# Patient Record
Sex: MALE | Race: WHITE | NOT HISPANIC OR LATINO | URBAN - METROPOLITAN AREA
[De-identification: names, ages, dates, MRNs, and addresses within clinical notes are randomized per-mention and may not be internally consistent; named-entity substitution may affect disease eponyms.]

---

## 2023-05-23 ENCOUNTER — OFFICE VISIT (OUTPATIENT)
Dept: PRIMARY CARE CLINIC | Facility: CLINIC | Age: 63
End: 2023-05-23
Payer: OTHER GOVERNMENT

## 2023-05-23 ENCOUNTER — PATIENT MESSAGE (OUTPATIENT)
Dept: PRIMARY CARE CLINIC | Facility: CLINIC | Age: 63
End: 2023-05-23

## 2023-05-23 VITALS
DIASTOLIC BLOOD PRESSURE: 76 MMHG | SYSTOLIC BLOOD PRESSURE: 129 MMHG | WEIGHT: 222 LBS | OXYGEN SATURATION: 98 % | BODY MASS INDEX: 31.78 KG/M2 | HEART RATE: 62 BPM | HEIGHT: 70 IN

## 2023-05-23 DIAGNOSIS — E83.39 HYPOPHOSPHATASIA: ICD-10-CM

## 2023-05-23 DIAGNOSIS — G47.33 OBSTRUCTIVE SLEEP APNEA SYNDROME: Primary | ICD-10-CM

## 2023-05-23 DIAGNOSIS — Z98.890 H/O BASAL CELL CARCINOMA EXCISION: ICD-10-CM

## 2023-05-23 DIAGNOSIS — R21 SKIN RASH: ICD-10-CM

## 2023-05-23 DIAGNOSIS — E66.9 OBESITY (BMI 30.0-34.9): ICD-10-CM

## 2023-05-23 DIAGNOSIS — Z12.11 SCREENING FOR COLON CANCER: ICD-10-CM

## 2023-05-23 DIAGNOSIS — M24.541 CONTRACTURE OF JOINT OF RIGHT HAND: ICD-10-CM

## 2023-05-23 DIAGNOSIS — Z85.828 H/O BASAL CELL CARCINOMA EXCISION: ICD-10-CM

## 2023-05-23 DIAGNOSIS — Z12.5 SCREENING FOR PROSTATE CANCER: ICD-10-CM

## 2023-05-23 PROCEDURE — 99204 OFFICE O/P NEW MOD 45 MIN: CPT | Mod: S$GLB,,, | Performed by: INTERNAL MEDICINE

## 2023-05-23 PROCEDURE — 99204 PR OFFICE/OUTPT VISIT, NEW, LEVL IV, 45-59 MIN: ICD-10-PCS | Mod: S$GLB,,, | Performed by: INTERNAL MEDICINE

## 2023-05-23 RX ORDER — MUPIROCIN 20 MG/G
OINTMENT TOPICAL 3 TIMES DAILY
Qty: 30 G | Refills: 0 | Status: SHIPPED | OUTPATIENT
Start: 2023-05-23 | End: 2023-07-07

## 2023-05-23 NOTE — PROGRESS NOTES
Subjective:      Patient ID: Triston León is a 62 y.o. male.    Chief Complaint: Establish Care    HPI    Past Medical History:   Diagnosis Date    Lumbar herniated disc     L5    Skin cancer     every 2-3 yrs         Past Surgical History:   Procedure Laterality Date    ADENOIDECTOMY      DENTAL SURGERY      SKIN CANCER EXCISION          Family History   Problem Relation Age of Onset    No Known Problems Mother     Heart disease Father     Diabetes Maternal Grandmother     Diabetes Maternal Grandfather     Heart disease Paternal Grandfather          Social History     Socioeconomic History    Marital status:    Tobacco Use    Smoking status: Never     Passive exposure: Never    Smokeless tobacco: Never   Substance and Sexual Activity    Alcohol use: Yes     Comment: 3 times a week with 2 beers or 2 glasses of wine on each day.    Drug use: Never     Patient here to establish care    He used to be an army  and suffered a herniated disc and has broken both wrists when he was 7 and then 8 these were both due to trauma/falls    He has a contracted left 5th digit and states he has a family history of hypophosphatasia    H/o Mohs surgery for basal cancer under the left eye, he has a h/o basal cell cancers and will also need another skin check     Patient also states his former wife told him he gasps for air at night and would like a sleep study               Review of Systems   Constitutional:  Negative for chills and fever.   HENT:  Negative for hearing loss.    Eyes:  Negative for blurred vision.   Respiratory:  Negative for cough, shortness of breath and wheezing.    Cardiovascular:  Negative for chest pain, palpitations and leg swelling.   Gastrointestinal:  Negative for abdominal pain, blood in stool, constipation, diarrhea, melena, nausea and vomiting.   Genitourinary:  Negative for dysuria, frequency and urgency.   Musculoskeletal:  Negative for falls.   Skin:  Positive for rash.        Behind  "left ear lobe   Neurological:  Negative for dizziness and headaches.   Endo/Heme/Allergies:  Does not bruise/bleed easily.   Psychiatric/Behavioral:  Negative for depression. The patient is not nervous/anxious.    Objective:     Physical Exam  Vitals reviewed.   Constitutional:       Appearance: Normal appearance.   HENT:      Head: Normocephalic.      Mouth/Throat:      Mouth: Mucous membranes are moist.      Pharynx: Oropharynx is clear.   Eyes:      Extraocular Movements: Extraocular movements intact.      Conjunctiva/sclera: Conjunctivae normal.      Pupils: Pupils are equal, round, and reactive to light.   Cardiovascular:      Rate and Rhythm: Normal rate and regular rhythm.   Pulmonary:      Effort: Pulmonary effort is normal.      Breath sounds: Normal breath sounds.   Abdominal:      General: Bowel sounds are normal.   Musculoskeletal:         General: Deformity present.      Right lower leg: No edema.      Left lower leg: No edema.      Comments: Right 5th digit   Skin:     General: Skin is warm.      Capillary Refill: Capillary refill takes less than 2 seconds.   Neurological:      General: No focal deficit present.      Mental Status: He is alert.   Psychiatric:         Mood and Affect: Mood normal.   /76 (BP Location: Left arm, Patient Position: Sitting, BP Method: Medium (Automatic))   Pulse 62   Ht 5' 10" (1.778 m)   Wt 100.7 kg (222 lb)   SpO2 98%   BMI 31.85 kg/m²     Assessment:       ICD-10-CM ICD-9-CM   1. Obstructive sleep apnea syndrome  G47.33 327.23   2. Contracture of joint of right hand  M24.541 718.44   3. 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 Screening for prostate cancer  Z12.5 V76.44   5. Screening for colon cancer  Z12.11 V76.51   6. H/O basal cell carcinoma excision  Z98.890 V10.83    Z85.828    7. Skin rash  R21 782.1   8. Obesity (BMI 30.0-34.9)  E66.9 278.00       Plan:          1. Obstructive sleep apnea syndrome  -     Ambulatory referral/consult to Pulmonology; Future; " Expected date: 05/30/2023    2. Contracture of joint of right hand  -     Ambulatory referral/consult to Orthopedics; Future; Expected date: 05/30/2023    3. Hypophosphatasia  -     CBC Auto Differential; Future; Expected date: 05/23/2023  -     Comprehensive Metabolic Panel; Future; Expected date: 05/23/2023  -     Lipid Panel; Future; Expected date: 05/23/2023  -     PTH, intact; Future; Expected date: 05/23/2023    4. Screening for prostate cancer  -     PSA, Screening; Future; Expected date: 05/23/2023    5. Screening for colon cancer  -     Ambulatory referral/consult to Gastroenterology; Future; Expected date: 05/30/2023    6. H/O basal cell carcinoma excision  -     Ambulatory referral/consult to Dermatology; Future; Expected date: 05/30/2023    7. Skin rash  -     mupirocin (BACTROBAN) 2 % ointment; Apply topically 3 (three) times daily.  Dispense: 30 g; Refill: 0    8. Obesity (BMI 30.0-34.9)         Counseled on diet and exercise

## 2023-05-24 LAB
ABS NRBC COUNT: 0 THOU/UL (ref 0–0.01)
ABSOLUTE BASOPHIL: 0.1 10*3/UL (ref 0–0.3)
ABSOLUTE EOSINOPHIL: 0.3 10*3/UL (ref 0–0.6)
ABSOLUTE IMMATURE GRAN: 0.05 THOU/UL (ref 0–0.03)
ABSOLUTE LYMPHOCYTE: 1.3 10*3/UL (ref 1.2–4)
ABSOLUTE MONOCYTE: 0.6 10*3/UL (ref 0.1–0.8)
ALBUMIN SERPL BCP-MCNC: 3.4 G/DL (ref 3.4–5)
ALP SERPL-CCNC: 76 U/L (ref 45–117)
ALT SERPL W P-5'-P-CCNC: 68 U/L (ref 16–61)
ANION GAP SERPL CALC-SCNC: 8 MMOL/L (ref 3–11)
AST SERPL-CCNC: 26 U/L (ref 15–37)
BASOPHILS NFR BLD: 0.9 % (ref 0–3)
BILIRUB SERPL-MCNC: 0.4 MG/DL (ref 0.2–1)
BUN SERPL-MCNC: 8 MG/DL (ref 7–18)
BUN/CREAT SERPL: 6.83 RATIO
CALCIUM SERPL-MCNC: 8.6 MG/DL (ref 8.5–10.1)
CHLORIDE SERPL-SCNC: 110 MMOL/L (ref 98–107)
CHOLEST SERPL-MSCNC: 107 MG/DL
CO2 SERPL-SCNC: 25 MMOL/L (ref 21–32)
CREAT SERPL-MCNC: 1.17 MG/DL (ref 0.7–1.3)
EOSINOPHIL NFR BLD: 3.8 % (ref 0–6)
ERYTHROCYTE [DISTWIDTH] IN BLOOD BY AUTOMATED COUNT: 13.2 % (ref 0–15.5)
GFR ESTIMATION: > 60
GLUCOSE SERPL-MCNC: 105 MG/DL (ref 74–106)
HCT VFR BLD AUTO: 43.6 % (ref 42–52)
HDLC SERPL-MCNC: 58 MG/DL
HGB BLD-MCNC: 14.7 G/DL (ref 14–18)
IMMATURE GRANULOCYTES: 0.8 % (ref 0–0.43)
LDLC SERPL CALC-MCNC: 44.8 MG/DL
LYMPHOCYTES NFR BLD: 19.1 % (ref 20–45)
MCH RBC QN AUTO: 32 PG (ref 27–32)
MCHC RBC AUTO-ENTMCNC: 33.7 % (ref 32–36)
MCV RBC AUTO: 94.8 FL (ref 80–97)
MONOCYTES NFR BLD: 9 % (ref 2–10)
NEUTROPHILS # BLD AUTO: 4.4 10*3/UL (ref 1.4–7)
NEUTROPHILS NFR BLD: 66.4 % (ref 50–80)
NUCLEATED RED BLOOD CELLS: 0 % (ref 0–0.2)
PLATELETS: 232 10*3/UL (ref 130–400)
PMV BLD AUTO: 10.9 FL (ref 9.2–12.2)
POTASSIUM SERPL-SCNC: 4.1 MMOL/L (ref 3.5–5.1)
PROT SERPL-MCNC: 6.4 G/DL (ref 6.4–8.2)
PSA: 1.95 NG/ML (ref 0.1–4)
PTH, INTACT: 75.3 PG/ML (ref 18.5–88)
RBC # BLD AUTO: 4.6 10*6/UL (ref 4.7–6.1)
SODIUM BLD-SCNC: 143 MMOL/L (ref 131–143)
TRIGL SERPL-MCNC: 21 MG/DL (ref 0–149)
VLDL CHOLESTEROL: 4 MG/DL
WBC # BLD: 6.6 10*3/UL (ref 4.5–10)

## 2023-06-16 ENCOUNTER — PATIENT MESSAGE (OUTPATIENT)
Dept: DERMATOLOGY | Facility: CLINIC | Age: 63
End: 2023-06-16
Payer: OTHER GOVERNMENT

## 2023-06-16 ENCOUNTER — TELEPHONE (OUTPATIENT)
Dept: PRIMARY CARE CLINIC | Facility: CLINIC | Age: 63
End: 2023-06-16
Payer: OTHER GOVERNMENT

## 2023-06-16 NOTE — TELEPHONE ENCOUNTER
----- Message from Brenna Alonso sent at 6/16/2023 12:48 PM CDT -----  Contact: Noemy/[facility]  Noemy called in to say that the refferal is received and the patient is schedule for 6/27/23@1:00 she stated no call back is needed.    Thanks  Td

## 2023-07-07 ENCOUNTER — OFFICE VISIT (OUTPATIENT)
Dept: GASTROENTEROLOGY | Facility: CLINIC | Age: 63
End: 2023-07-07
Payer: OTHER GOVERNMENT

## 2023-07-07 VITALS
SYSTOLIC BLOOD PRESSURE: 130 MMHG | HEART RATE: 67 BPM | WEIGHT: 219 LBS | HEIGHT: 70 IN | OXYGEN SATURATION: 98 % | BODY MASS INDEX: 31.35 KG/M2 | DIASTOLIC BLOOD PRESSURE: 89 MMHG

## 2023-07-07 DIAGNOSIS — Z12.11 SCREENING FOR COLON CANCER: ICD-10-CM

## 2023-07-07 DIAGNOSIS — K21.9 GASTROESOPHAGEAL REFLUX DISEASE, UNSPECIFIED WHETHER ESOPHAGITIS PRESENT: Primary | ICD-10-CM

## 2023-07-07 PROCEDURE — 99203 PR OFFICE/OUTPT VISIT, NEW, LEVL III, 30-44 MIN: ICD-10-PCS | Mod: S$GLB,,,

## 2023-07-07 PROCEDURE — 99203 OFFICE O/P NEW LOW 30 MIN: CPT | Mod: S$GLB,,,

## 2023-07-07 RX ORDER — SOD SULF/POT CHLORIDE/MAG SULF 1.479 G
12 TABLET ORAL DAILY
Qty: 24 TABLET | Refills: 0 | Status: SHIPPED | OUTPATIENT
Start: 2023-07-07

## 2023-07-07 NOTE — PROGRESS NOTES
Clinic Note    Reason for visit:  The primary encounter diagnosis was Gastroesophageal reflux disease, unspecified whether esophagitis present. A diagnosis of Screening for colon cancer was also pertinent to this visit.    PCP: Erin Hannah   [address] [facility] [address] / [place] [address]    HPI:  This is a 62 y.o. male who is here to establish care. Patient is overdue for first screening colonoscopy. No FH of colon cancer. He denies abdominal pain, diarrhea, constipation, or blood in stool. He has had some reflux more recently due to diet change. He moved here recently from [place] and was eating fresh vegetables and healthier food there.       Review of Systems   Constitutional:  Negative for diaphoresis, fatigue, fever and unexpected weight change.   HENT:  Negative for hearing loss, mouth sores, postnasal drip, sore throat and trouble swallowing.    Eyes:  Negative for pain, discharge and eye dryness.   Respiratory:  Positive for apnea. Negative for cough, choking, chest tightness, shortness of breath and wheezing.    Cardiovascular:  Negative for chest pain, palpitations and leg swelling.   Gastrointestinal:  Negative for abdominal distention, abdominal pain, anal bleeding, blood in stool, change in bowel habit, constipation, diarrhea, nausea, rectal pain, vomiting, reflux, fecal incontinence and change in bowel habit.   Genitourinary:  Negative for bladder incontinence, dysuria and hematuria.   Musculoskeletal:  Positive for joint swelling. Negative for arthralgias and back pain.   Integumentary:  Negative for color change and rash.   Allergic/Immunologic: Negative for environmental allergies and food allergies.   Neurological:  Positive for headaches. Negative for seizures.   Hematological:  Negative for adenopathy. Does not bruise/bleed easily.      Past Medical History:   Diagnosis Date    BMI 31.0-31.9,adult     Dupuytren's contracture     R hand, pinky finger    History of hepatitis A 1981    Lumbar  "herniated disc     L5    Skin cancer     every 2-3 yrs     Past Surgical History:   Procedure Laterality Date    ADENOIDECTOMY      DENTAL SURGERY      SKIN CANCER EXCISION      WRIST SURGERY      x 2     Family History   Problem Relation Age of Onset    No Known Problems Mother     Heart disease Father     Diabetes Maternal Grandmother     Diabetes Maternal Grandfather     Heart disease Paternal Grandfather      Social History     Tobacco Use    Smoking status: Never     Passive exposure: Never    Smokeless tobacco: Never   Substance Use Topics    Alcohol use: Yes     Comment: 3 times a week with 2 beers or 2 glasses of wine on each day.    Drug use: Never     Review of patient's allergies indicates:   Allergen Reactions    Marijuana/cannabinoid Nausea And Vomiting      Medication List with Changes/Refills   New Medications    SOD SULF-POT CHLORIDE-MAG SULF (SUTAB) 1.479-0.188- 0.225 GRAM TABLET    Take 12 tablets by mouth once daily. Take according to package instructions with indicated amount of water. No breakfast day before test. May substitute with Suprep, Clenpiq, Plenvu, Moviprep or GoLytely based on Rx plan and patient preference.   Discontinued Medications    MUPIROCIN (BACTROBAN) 2 % OINTMENT    Apply topically 3 (three) times daily.         Vital Signs:  /89   Pulse 67   Ht 5' 10" (1.778 m)   Wt 99.3 kg (219 lb)   SpO2 98%   BMI 31.42 kg/m²        Physical Exam  Constitutional:       General: He is not in acute distress.     Appearance: Normal appearance. He is well-developed. He is not ill-appearing or toxic-appearing.   HENT:      Head: Normocephalic and atraumatic.      Nose: Nose normal.      Mouth/Throat:      Mouth: Mucous membranes are moist.      Pharynx: Oropharynx is clear. No oropharyngeal exudate or posterior oropharyngeal erythema.   Eyes:      General: Lids are normal. No scleral icterus.        Right eye: No discharge.         Left eye: No discharge.      Conjunctiva/sclera: " Conjunctivae normal.   Cardiovascular:      Rate and Rhythm: Normal rate and regular rhythm.      Pulses:           Radial pulses are 2+ on the right side and 2+ on the left side.   Pulmonary:      Effort: Pulmonary effort is normal. No respiratory distress.      Breath sounds: No stridor. No wheezing.   Abdominal:      General: Bowel sounds are normal. There is no distension.      Palpations: Abdomen is soft. There is no fluid wave, hepatomegaly, splenomegaly or mass.      Tenderness: There is no abdominal tenderness. There is no guarding or rebound.   Musculoskeletal:      Cervical back: Full passive range of motion without pain.   Lymphadenopathy:      Cervical: No cervical adenopathy.   Skin:     General: Skin is warm and dry.      Capillary Refill: Capillary refill takes less than 2 seconds.      Coloration: Skin is not cyanotic, jaundiced or pale.   Neurological:      General: No focal deficit present.      Mental Status: He is alert and oriented to person, place, and time.   Psychiatric:         Mood and Affect: Mood normal.         Behavior: Behavior is cooperative.          All of the data above and below has been reviewed by myself and any further interpretations will be reflected in the assessment and plan.   The data includes review of external notes, and independent interpretation of lab results, procedures, x-rays, and imaging reports.      Assessment:  Gastroesophageal reflux disease, unspecified whether esophagitis present    Screening for colon cancer  -     Ambulatory referral/consult to Gastroenterology  -     sod sulf-pot chloride-mag sulf (SUTAB) 1.479-0.188- 0.225 gram tablet; Take 12 tablets by mouth once daily. Take according to package instructions with indicated amount of water. No breakfast day before test. May substitute with Suprep, Clenpiq, Plenvu, Moviprep or GoLytely based on Rx plan and patient preference.  Dispense: 24 tablet; Refill: 0  -     Ambulatory Referral to External  Surgery    GERD: takes Gas-X as needed.      Recommendations:  Schedule colonoscopy with Dr. Adams at [facility] with sutab.     Risks, benefits, and alternatives of medical management, any associated procedures, and/or treatment discussed with the patient. Patient given opportunity to ask questions and voices understanding. Patient has elected to proceed with the recommended care modalities as discussed.    Follow up if symptoms worsen or fail to improve.    Order summary:  Orders Placed This Encounter   Procedures    Ambulatory Referral to External Surgery        Instructed patient to notify my office if they have not been contacted within two weeks after any procedures, submitting any samples (biopsies, blood, stool, urine, etc.) or after any imaging (X-ray, CT, MRI, etc.).      Olga Perez NP    This document may have been created using a voice recognition transcribing system. Incorrect words or phrases may have been missed during proofreading. Please interpret accordingly or contact me for clarification.

## 2023-07-07 NOTE — PATIENT INSTRUCTIONS
Schedule colonoscopy with Dr. Adams.    Please notify my office if you have not been contacted within two weeks after any procedures, submitting any samples (biopsies, blood, stool, urine, etc.) or after any imaging (X-ray, CT, MRI, etc.).

## 2023-08-17 ENCOUNTER — TELEPHONE (OUTPATIENT)
Dept: GASTROENTEROLOGY | Facility: CLINIC | Age: 63
End: 2023-08-17
Payer: OTHER GOVERNMENT

## 2023-08-21 ENCOUNTER — TELEPHONE (OUTPATIENT)
Dept: GASTROENTEROLOGY | Facility: CLINIC | Age: 63
End: 2023-08-21
Payer: OTHER GOVERNMENT

## 2023-08-21 DIAGNOSIS — Z12.11 SCREENING FOR COLON CANCER: Primary | ICD-10-CM

## 2023-08-21 NOTE — TELEPHONE ENCOUNTER
RETURNED CALLERS CALL AND GOT THE MEDICATION SENT AGAIN TO [facility] AND INFORMED PT

## 2023-08-21 NOTE — PROGRESS NOTES
"[facility] [facility] [facility]  [address] [address]  Phone: [phone]  Fax: [phone]    History & Physical         Provider: Dr. Gunnar Adams    Patient Name: Triston Silva                                                                       (age):1960  62 y.o.           Gender: male   Phone: [phone]     Referring Physician: Erin Hannah     Vital Signs:   Height - 5' 10"  Weight - 219 lbs  BMI -  31.4    Plan: Colonoscopy    Encounter Diagnosis   Name Primary?    Screening for colon cancer Yes           History:      Past Medical History:   Diagnosis Date    BMI 31.0-31.9,adult     Dupuytren's contracture     R hand, pinky finger    History of hepatitis A     Lumbar herniated disc     L5    Skin cancer     every 2-3 yrs      Past Surgical History:   Procedure Laterality Date    ADENOIDECTOMY      DENTAL SURGERY      SKIN CANCER EXCISION      WRIST SURGERY      x 2      Medication List with Changes/Refills   Current Medications    SOD SULF-POT CHLORIDE-MAG SULF (SUTAB) 1.479-0.188- 0.225 GRAM TABLET    Take 12 tablets by mouth once daily. Take according to package instructions with indicated amount of water. No breakfast day before test. May substitute with Suprep, Clenpiq, Plenvu, Moviprep or GoLytely based on Rx plan and patient preference.      Review of patient's allergies indicates:   Allergen Reactions    Marijuana/cannabinoid Nausea And Vomiting      Family History   Problem Relation Age of Onset    No Known Problems Mother     Heart disease Father     Diabetes Maternal Grandmother     Diabetes Maternal Grandfather     Heart disease Paternal Grandfather       Social History     Tobacco Use    Smoking status: Never     Passive exposure: Never    Smokeless tobacco: Never   Substance Use Topics    Alcohol use: Yes     Comment: 3 times a week with 2 beers or 2 glasses of wine on each day.    Drug use: Never        Physical " Examination:     General Appearance:___________________________  HEENT: _____________________________________  Abdomen:____________________________________  Heart:________________________________________  Lungs:_______________________________________  Extremities:___________________________________  Skin:_________________________________________  Endocrine:____________________________________  Genitourinary:_________________________________  Neurological:__________________________________      Patient has been evaluated immediately prior to sedation and is medically cleared for endoscopy with IVCS as an ASA class: ______      Physician Signature: _________________________       Date: ________  Time: ________

## 2023-08-21 NOTE — TELEPHONE ENCOUNTER
----- Message from Rosy Casey sent at 8/21/2023  4:38 PM CDT -----  Patient is calling in regards to pharmacy [facility] have not receive pre for colonoscopy procedure..please call patient back at [phone] or [phone]                  Thanks  candelario

## 2023-08-25 ENCOUNTER — PATIENT MESSAGE (OUTPATIENT)
Dept: PRIMARY CARE CLINIC | Facility: CLINIC | Age: 63
End: 2023-08-25
Payer: OTHER GOVERNMENT

## 2023-08-25 ENCOUNTER — OUTSIDE PLACE OF SERVICE (OUTPATIENT)
Dept: GASTROENTEROLOGY | Facility: CLINIC | Age: 63
End: 2023-08-25

## 2023-08-25 LAB — CRC RECOMMENDATION EXT: NORMAL

## 2023-08-25 PROCEDURE — 45385 PR COLONOSCOPY,REMV LESN,SNARE: ICD-10-PCS | Mod: PT,,, | Performed by: INTERNAL MEDICINE

## 2023-08-25 PROCEDURE — 45385 COLONOSCOPY W/LESION REMOVAL: CPT | Mod: PT,,, | Performed by: INTERNAL MEDICINE

## 2023-09-20 ENCOUNTER — DOCUMENTATION ONLY (OUTPATIENT)
Dept: GASTROENTEROLOGY | Facility: CLINIC | Age: 63
End: 2023-09-20
Payer: OTHER GOVERNMENT

## 2023-09-28 ENCOUNTER — OFFICE VISIT (OUTPATIENT)
Dept: DERMATOLOGY | Facility: CLINIC | Age: 63
End: 2023-09-28
Payer: OTHER GOVERNMENT

## 2023-09-28 VITALS — BODY MASS INDEX: 31.34 KG/M2 | HEIGHT: 70 IN | WEIGHT: 218.94 LBS

## 2023-09-28 DIAGNOSIS — D48.5 NEOPLASM OF UNCERTAIN BEHAVIOR OF SKIN: Primary | ICD-10-CM

## 2023-09-28 DIAGNOSIS — L57.0 ACTINIC KERATOSES: ICD-10-CM

## 2023-09-28 DIAGNOSIS — L82.1 SEBORRHEIC KERATOSES: ICD-10-CM

## 2023-09-28 PROCEDURE — 17003 DESTRUCTION, PREMALIGNANT LESIONS; SECOND THROUGH 14 LESIONS: ICD-10-PCS | Mod: S$PBB,,, | Performed by: STUDENT IN AN ORGANIZED HEALTH CARE EDUCATION/TRAINING PROGRAM

## 2023-09-28 PROCEDURE — 88305 TISSUE EXAM BY PATHOLOGIST: ICD-10-PCS | Mod: 26,,, | Performed by: PATHOLOGY

## 2023-09-28 PROCEDURE — 11102 TANGNTL BX SKIN SINGLE LES: CPT | Mod: PBBFAC | Performed by: STUDENT IN AN ORGANIZED HEALTH CARE EDUCATION/TRAINING PROGRAM

## 2023-09-28 PROCEDURE — 99203 OFFICE O/P NEW LOW 30 MIN: CPT | Mod: 25,S$PBB,, | Performed by: STUDENT IN AN ORGANIZED HEALTH CARE EDUCATION/TRAINING PROGRAM

## 2023-09-28 PROCEDURE — 99999 PR PBB SHADOW E&M-EST. PATIENT-LVL III: ICD-10-PCS | Mod: PBBFAC,,, | Performed by: STUDENT IN AN ORGANIZED HEALTH CARE EDUCATION/TRAINING PROGRAM

## 2023-09-28 PROCEDURE — 11103 TANGNTL BX SKIN EA SEP/ADDL: CPT | Mod: S$PBB,,, | Performed by: STUDENT IN AN ORGANIZED HEALTH CARE EDUCATION/TRAINING PROGRAM

## 2023-09-28 PROCEDURE — 17000 PR DESTRUCTION(LASER SURGERY,CRYOSURGERY,CHEMOSURGERY),PREMALIGNANT LESIONS,FIRST LESION: ICD-10-PCS | Mod: S$PBB,XS,, | Performed by: STUDENT IN AN ORGANIZED HEALTH CARE EDUCATION/TRAINING PROGRAM

## 2023-09-28 PROCEDURE — 17003 DESTRUCT PREMALG LES 2-14: CPT | Mod: PBBFAC | Performed by: STUDENT IN AN ORGANIZED HEALTH CARE EDUCATION/TRAINING PROGRAM

## 2023-09-28 PROCEDURE — 99999 PR PBB SHADOW E&M-EST. PATIENT-LVL III: CPT | Mod: PBBFAC,,, | Performed by: STUDENT IN AN ORGANIZED HEALTH CARE EDUCATION/TRAINING PROGRAM

## 2023-09-28 PROCEDURE — 99213 OFFICE O/P EST LOW 20 MIN: CPT | Mod: PBBFAC,25 | Performed by: STUDENT IN AN ORGANIZED HEALTH CARE EDUCATION/TRAINING PROGRAM

## 2023-09-28 PROCEDURE — 11102 TANGNTL BX SKIN SINGLE LES: CPT | Mod: S$PBB,,, | Performed by: STUDENT IN AN ORGANIZED HEALTH CARE EDUCATION/TRAINING PROGRAM

## 2023-09-28 PROCEDURE — 17003 DESTRUCT PREMALG LES 2-14: CPT | Mod: S$PBB,,, | Performed by: STUDENT IN AN ORGANIZED HEALTH CARE EDUCATION/TRAINING PROGRAM

## 2023-09-28 PROCEDURE — 11102 PR TANGENTIAL BIOPSY, SKIN, SINGLE LESION: ICD-10-PCS | Mod: S$PBB,,, | Performed by: STUDENT IN AN ORGANIZED HEALTH CARE EDUCATION/TRAINING PROGRAM

## 2023-09-28 PROCEDURE — 17000 DESTRUCT PREMALG LESION: CPT | Mod: S$PBB,XS,, | Performed by: STUDENT IN AN ORGANIZED HEALTH CARE EDUCATION/TRAINING PROGRAM

## 2023-09-28 PROCEDURE — 17000 DESTRUCT PREMALG LESION: CPT | Mod: 59,PBBFAC | Performed by: STUDENT IN AN ORGANIZED HEALTH CARE EDUCATION/TRAINING PROGRAM

## 2023-09-28 PROCEDURE — 88305 TISSUE EXAM BY PATHOLOGIST: CPT | Mod: 26,,, | Performed by: PATHOLOGY

## 2023-09-28 PROCEDURE — 99203 PR OFFICE/OUTPT VISIT, NEW, LEVL III, 30-44 MIN: ICD-10-PCS | Mod: 25,S$PBB,, | Performed by: STUDENT IN AN ORGANIZED HEALTH CARE EDUCATION/TRAINING PROGRAM

## 2023-09-28 PROCEDURE — 88305 TISSUE EXAM BY PATHOLOGIST: CPT | Performed by: PATHOLOGY

## 2023-09-28 PROCEDURE — 11103 PR TANGENTIAL BIOPSY, SKIN, EA ADDTL LESION: ICD-10-PCS | Mod: S$PBB,,, | Performed by: STUDENT IN AN ORGANIZED HEALTH CARE EDUCATION/TRAINING PROGRAM

## 2023-09-28 PROCEDURE — 11103 TANGNTL BX SKIN EA SEP/ADDL: CPT | Mod: PBBFAC | Performed by: STUDENT IN AN ORGANIZED HEALTH CARE EDUCATION/TRAINING PROGRAM

## 2023-09-28 NOTE — PROGRESS NOTES
Subjective:       Patient ID:  Triston Silva is a 63 y.o. male who presents for   Chief Complaint   Patient presents with    Spot     Pt has come in for skin check      History of Present Illness: The patient presents with chief complaint of multiple skin lesions and non healing skin lesions.   Location: nose, behind the ear and the left neck. Also several red scaly areas on the arms and the face  Duration: months  Signs/Symptoms: several non healing skin lesions  Prior treatments: none  Patient has a history of multiple skin cancers.       Spot        Review of Systems   Constitutional:  Negative for fever and chills.   Skin:  Negative for itching, rash and dry skin.        Objective:    Physical Exam   Constitutional: He appears well-developed and well-nourished. No distress.   Neurological: He is alert and oriented to person, place, and time. He is not disoriented.   Psychiatric: He has a normal mood and affect.   Skin:   Areas Examined (abnormalities noted in diagram):   Head / Face Inspection Performed  Neck Inspection Performed  Chest / Axilla Inspection Performed  Abdomen Inspection Performed  Back Inspection Performed  RUE Inspected  LUE Inspection Performed  RLE Inspected  LLE Inspection Performed                   Diagram Legend     Erythematous scaling macule/papule c/w actinic keratosis       Vascular papule c/w angioma      Pigmented verrucoid papule/plaque c/w seborrheic keratosis      Yellow umbilicated papule c/w sebaceous hyperplasia      Irregularly shaped tan macule c/w lentigo     1-2 mm smooth white papules consistent with Milia      Movable subcutaneous cyst with punctum c/w epidermal inclusion cyst      Subcutaneous movable cyst c/w pilar cyst      Firm pink to brown papule c/w dermatofibroma      Pedunculated fleshy papule(s) c/w skin tag(s)      Evenly pigmented macule c/w junctional nevus     Mildly variegated pigmented, slightly irregular-bordered macule c/w mildly atypical  nevus      Flesh colored to evenly pigmented papule c/w intradermal nevus       Pink pearly papule/plaque c/w basal cell carcinoma      Erythematous hyperkeratotic cursted plaque c/w SCC      Surgical scar with no sign of skin cancer recurrence      Open and closed comedones      Inflammatory papules and pustules      Verrucoid papule consistent consistent with wart     Erythematous eczematous patches and plaques     Dystrophic onycholytic nail with subungual debris c/w onychomycosis     Umbilicated papule    Erythematous-base heme-crusted tan verrucoid plaque consistent with inflamed seborrheic keratosis     Erythematous Silvery Scaling Plaque c/w Psoriasis     See annotation                Assessment / Plan:      Pathology Orders:       Normal Orders This Visit    Specimen to Pathology, Dermatology     Questions:    Procedure Type: Dermatology and skin neoplasms    Number of Specimens: 3    ------------------------: -------------------------    Spec 1 Procedure: Biopsy    Spec 1 Clinical Impression: r/o NMSC    Spec 1 Source: right nasal ala    ------------------------: -------------------------    Spec 2 Procedure: Biopsy    Spec 2 Clinical Impression: r/o NMSC    Spec 2 Source: left neck    ------------------------: -------------------------    Spec 3 Procedure: Biopsy    Spec 3 Clinical Impression: r/o NMSC    Spec 3 Source: left posterior ear    Release to patient:           Neoplasm of uncertain behavior of skin  -     Specimen to Pathology, Dermatology  Shave biopsy procedure note:    Shave biopsy performed after verbal consent including risk of infection, scar, recurrence, need for additional treatment of site. Area prepped with alcohol, anesthetized with approximately 1.0cc of 1% lidocaine with epinephrine. Lesional tissue shaved with razor blade. Hemostasis achieved with application of aluminum chloride followed by hyfrecation. No complications. Dressing applied. Wound care explained.    If biopsy  positive for malignancy, refer for Mohs surgery consultation.    Actinic keratoses  Cryosurgery Procedure Note    Verbal consent from the patient is obtained including, but not limited to, risk of hypopigmentation/hyperpigmentation, scar, recurrence of lesion. The patient is aware of the precancerous quality and need for treatment of these lesions. Liquid nitrogen cryosurgery is applied to the 8 actinic keratoses, as detailed in the physical exam, to produce a freeze injury. The patient is aware that blisters may form and is instructed on wound care with gentle cleansing and use of vaseline ointment to keep moist until healed. The patient is supplied a handout on cryosurgery and is instructed to call if lesions do not completely resolve.    Seborrheic keratoses  These are benign inherited growths without a malignant potential. Reassurance given to patient. No treatment is necessary.              Follow up in about 6 months (around 3/28/2024).

## 2023-10-03 LAB
FINAL PATHOLOGIC DIAGNOSIS: NORMAL
GROSS: NORMAL
Lab: NORMAL
MICROSCOPIC EXAM: NORMAL

## 2023-10-04 ENCOUNTER — TELEPHONE (OUTPATIENT)
Dept: DERMATOLOGY | Facility: CLINIC | Age: 63
End: 2023-10-04
Payer: OTHER GOVERNMENT

## 2023-10-04 NOTE — TELEPHONE ENCOUNTER
----- Message from Will Jordan MD sent at 10/4/2023  7:04 AM CDT -----  Hey! Please call and inform the patient that the biopsy results came back, and all 3 lesions are called basal cell carcinoma and given the location of the one on the nose and behind the ear, we will refer him to Mohs. Please refer to Dr. Timbo Kincaid.     The lesion on the neck, we can excise in clinic or ask Dr. Kincaid if he would remove it while he's getting the other lesions removed. It's up to the patient. Thanks!

## 2023-10-04 NOTE — TELEPHONE ENCOUNTER
Called and notified the patient of his results. He would prefer to have all three done together. He does not live in the area and would potentially like to get it done in [place]. Pt to send a message to me with the name, number. Dr. Jordan notified.

## 2023-10-12 ENCOUNTER — PATIENT MESSAGE (OUTPATIENT)
Dept: DERMATOLOGY | Facility: CLINIC | Age: 63
End: 2023-10-12
Payer: OTHER GOVERNMENT

## 2023-10-30 ENCOUNTER — OFFICE VISIT (OUTPATIENT)
Dept: PULMONOLOGY | Facility: CLINIC | Age: 63
End: 2023-10-30
Payer: OTHER GOVERNMENT

## 2023-10-30 VITALS
DIASTOLIC BLOOD PRESSURE: 76 MMHG | OXYGEN SATURATION: 97 % | HEIGHT: 70 IN | HEART RATE: 56 BPM | WEIGHT: 228.19 LBS | SYSTOLIC BLOOD PRESSURE: 122 MMHG | RESPIRATION RATE: 16 BRPM | BODY MASS INDEX: 32.67 KG/M2

## 2023-10-30 DIAGNOSIS — R06.83 LOUD SNORING: Primary | ICD-10-CM

## 2023-10-30 DIAGNOSIS — G47.33 OBSTRUCTIVE SLEEP APNEA SYNDROME: ICD-10-CM

## 2023-10-30 DIAGNOSIS — E66.9 OBESITY, UNSPECIFIED CLASSIFICATION, UNSPECIFIED OBESITY TYPE, UNSPECIFIED WHETHER SERIOUS COMORBIDITY PRESENT: ICD-10-CM

## 2023-10-30 DIAGNOSIS — R06.81 WITNESSED EPISODE OF APNEA: ICD-10-CM

## 2023-10-30 PROCEDURE — 99204 PR OFFICE/OUTPT VISIT, NEW, LEVL IV, 45-59 MIN: ICD-10-PCS | Mod: S$GLB,,, | Performed by: INTERNAL MEDICINE

## 2023-10-30 PROCEDURE — 99204 OFFICE O/P NEW MOD 45 MIN: CPT | Mod: S$GLB,,, | Performed by: INTERNAL MEDICINE

## 2023-10-30 NOTE — PROGRESS NOTES
Pulmonary Medicine Clinic Note    Patient Identification:   Patient ID: Triston Silva is a 63 y.o. male.    Referring Provider:  Dr. Erin Hannah     Chief Complaint:  Sleep Apnea      History of Present Illness:    Triston Silva is a 63 y.o. male who presents the evaluation of possible sleep disorder breathing.    Patient sleeps around 6 hours at nighttime.  Sometimes he wakes up prior to alarm.  Feeling of being refreshed depends upon what time he wakes up.  Denies any morning headaches but frequent dry mouth.  Wakes up usually 1-2 times at night to use the restroom.  Does not have family history of sleep apnea.  Does not smoke but occasionally drinks.  History of adenoidectomy at age 8 otherwise no polypectomy or deviated nasal septum surgeries.  Drinks about 1-2 cups of coffee every day.  Does not take naps but would like to take 1.  On times he takes naps he feels fuzzy.  Has no history of hypertension or cardiac disease or lung problems.  Has been told that he had bradycardia.  No pacemaker.  Mallampati class is 3, BMI is 32.74 kilos per m2.  Stop Bang score is elevated.  [place] Sleepiness scale score is 14 with a neck circumference of 17-1/2 inches.  He has been told that he stops breathing in his sleep and also snores loudly 2 point sometimes wakes him up from the echo of his own snoring from the wall.    Review of Systems:  Review of Systems   Constitutional:  Negative for fever, chills, weight loss, weight gain, activity change, appetite change, fatigue, night sweats and weakness.   HENT:  Negative for nosebleeds, postnasal drip, rhinorrhea, sinus pressure, sore throat, trouble swallowing, voice change, congestion, ear pain and hearing loss.    Eyes:  Negative for redness and itching.   Respiratory:  Positive for apnea, snoring and somnolence. Negative for cough, hemoptysis, sputum production, choking, chest tightness, shortness of breath, wheezing, orthopnea, previous  hospitialization due to pulmonary problems, asthma nighttime symptoms, pleurisy, dyspnea on extertion, use of rescue inhaler and Paroxysmal Nocturnal Dyspnea.    Cardiovascular:  Negative for chest pain, palpitations and leg swelling.   Genitourinary:  Negative for difficulty urinating and hematuria.   Endocrine:  Negative for polydipsia, polyphagia, cold intolerance, heat intolerance and polyuria.    Musculoskeletal:  Negative for arthralgias, back pain, gait problem, joint swelling and myalgias.   Skin:  Negative for rash.   Gastrointestinal:  Negative for nausea, vomiting, abdominal pain, abdominal distention and acid reflux.   Neurological:  Negative for dizziness, syncope, weakness, light-headedness and headaches.   Hematological:  Negative for adenopathy. Does not bruise/bleed easily and no excessive bruising.   Psychiatric/Behavioral:  Negative for confusion and sleep disturbance. The patient is not nervous/anxious.          Allergies:   Review of patient's allergies indicates:   Allergen Reactions    Marijuana/cannabinoid Nausea And Vomiting       Medications:      Past Medical History:      Past Medical History:   Diagnosis Date    BMI 31.0-31.9,adult     Dupuytren's contracture     R hand, pinky finger    History of hepatitis A 1981    Lumbar herniated disc     L5    Skin cancer     every 2-3 yrs       Family History:      Family History   Problem Relation Age of Onset    No Known Problems Mother     Heart disease Father     Diabetes Maternal Grandmother     Diabetes Maternal Grandfather     Heart disease Paternal Grandfather         Social History:      Past Surgical History:   Procedure Laterality Date    ADENOIDECTOMY      DENTAL SURGERY      SKIN CANCER EXCISION      WRIST SURGERY      x 2       Physical Exam:  Vitals:    10/30/23 1127   BP: 122/76   Pulse: (!) 56   Resp: 16     Physical Exam   Constitutional: He is oriented to person, place, and time. He appears not cachectic. No distress. He is  obese.   HENT:   Head: Normocephalic.   Right Ear: External ear normal.   Left Ear: External ear normal.   Nose: Nose normal. No mucosal edema. No polyps.   Mouth/Throat: Oropharynx is clear and moist. Normal dentition. No oropharyngeal exudate. Mallampati Score: III.   Neck: No JVD present. No tracheal deviation present. No thyromegaly present.   Cardiovascular: Normal rate, regular rhythm, normal heart sounds and intact distal pulses. Exam reveals no gallop and no friction rub.   No murmur heard.  Pulmonary/Chest: Normal expansion, symmetric chest wall expansion, effort normal and breath sounds normal. No stridor. No respiratory distress. He has no decreased breath sounds. He has no wheezes. He has no rhonchi. He has no rales. Chest wall is not dull to percussion. He exhibits no tenderness. Negative for egophony. Negative for tactile fremitus.   Abdominal: Soft. Bowel sounds are normal. He exhibits no distension and no mass. There is no hepatosplenomegaly. There is no abdominal tenderness. There is no rebound and no guarding. No hernia.   Musculoskeletal:         General: No tenderness, deformity or edema. Normal range of motion.      Cervical back: Normal range of motion and neck supple.   Lymphadenopathy: No supraclavicular adenopathy is present.     He has no cervical adenopathy.     He has no axillary adenopathy.   Neurological: He is alert and oriented to person, place, and time. He has normal reflexes. He displays normal reflexes. No cranial nerve deficit. He exhibits normal muscle tone.   Skin: Skin is warm and dry. No rash noted. He is not diaphoretic. No cyanosis or erythema. No pallor. Nails show no clubbing.   Psychiatric: He has a normal mood and affect. His behavior is normal. Judgment and thought content normal.         Accessory Clinical Data:  Previous Sleep Study findings:  None    ESS of 14    STOPBANG of 6    Do you snore loudly?  Do you feel tired, fatigued or sleepy during the day?  Has anyone  observed that you stop breathing in your sleep?  Do you have or been treated for high blood pressure?  BMI:  Age > 50?  Neck circumference > 40 cm:  Gender:     Assessment and Plan:    Problem List Items Addressed This Visit    None  Visit Diagnoses       Loud snoring    -  Primary    Obstructive sleep apnea syndrome        Relevant Orders    Home Sleep Study    Witnessed episode of apnea        Obesity, unspecified classification, unspecified obesity type, unspecified whether serious comorbidity present                 Patient has very high pre-test probability for moderate to severe MONIQUE.   He is very symptomatic.  I have discussed with him the pathophysiology of MONIQUE and the harmful cardiovascular and neurocognitive side effects,   morbidity and mortality associated with untreated sleep disordered breathing.  We discussed the available diagnostic and treatment modalities, their merits and demerits in detail.  All the questions were answered. He is agreeable to the following plan.   Given high pretest probability of moderate to severe sleep apnea, we will proceed with home sleep test, however, patient was advised that a negative home sleep test will not rule out clinically significant sleep apnea and in later scenario he would need an in-lab split night study.  More than 50% of 45 minutes time was spent face-to-face on counseling, in reviewing referring physicians notes, performing appropriate examination including risk stratification, assessing daytime sleepiness, risk for sleep disordered breathing, counseling and educating the patient regarding the pathophysiology of obstructive sleep apnea, available diagnostic and treatment modalities, importance of compliance, techniques of desensitization etc., ordering appropriate follow-up sleep studies, documenting clinical information in the electronic medical record and care coordination.      Follow up for after sleep study.  Thank you very much for involving me in the  care of this patient.  Please do not hesitate to reach me if you have any further questions or concerns.

## 2025-08-07 ENCOUNTER — OFFICE VISIT (OUTPATIENT)
Dept: PRIMARY CARE CLINIC | Facility: CLINIC | Age: 65
End: 2025-08-07
Payer: OTHER GOVERNMENT

## 2025-08-07 VITALS
HEIGHT: 70 IN | RESPIRATION RATE: 18 BRPM | OXYGEN SATURATION: 98 % | HEART RATE: 62 BPM | WEIGHT: 218 LBS | BODY MASS INDEX: 31.21 KG/M2 | SYSTOLIC BLOOD PRESSURE: 118 MMHG | DIASTOLIC BLOOD PRESSURE: 76 MMHG

## 2025-08-07 DIAGNOSIS — Z00.00 WELLNESS EXAMINATION: ICD-10-CM

## 2025-08-07 DIAGNOSIS — R21 RASH: Primary | ICD-10-CM

## 2025-08-07 DIAGNOSIS — C44.90 SKIN CANCER: ICD-10-CM

## 2025-08-07 PROCEDURE — 99214 OFFICE O/P EST MOD 30 MIN: CPT | Mod: S$PBB,,, | Performed by: INTERNAL MEDICINE

## 2025-08-07 RX ORDER — HYDROCORTISONE 25 MG/G
CREAM TOPICAL 2 TIMES DAILY
Qty: 30 G | Refills: 0 | Status: SHIPPED | OUTPATIENT
Start: 2025-08-07